# Patient Record
Sex: FEMALE | Race: WHITE | URBAN - METROPOLITAN AREA
[De-identification: names, ages, dates, MRNs, and addresses within clinical notes are randomized per-mention and may not be internally consistent; named-entity substitution may affect disease eponyms.]

---

## 2017-10-26 PROBLEM — J45.909 CHILDHOOD ASTHMA: Status: ACTIVE | Noted: 2017-10-26

## 2017-10-26 PROBLEM — Z98.891 HISTORY OF CESAREAN SECTION: Status: ACTIVE | Noted: 2017-10-26

## 2017-10-26 PROBLEM — Z34.90 PREGNANCY: Status: ACTIVE | Noted: 2017-10-26

## 2017-10-26 PROBLEM — Z86.32 HISTORY OF GESTATIONAL DIABETES: Status: ACTIVE | Noted: 2017-10-26

## 2017-12-04 PROBLEM — O24.419 GDM (GESTATIONAL DIABETES MELLITUS): Status: ACTIVE | Noted: 2017-12-04

## 2018-01-04 PROBLEM — J45.909 CHILDHOOD ASTHMA: Status: RESOLVED | Noted: 2017-10-26 | Resolved: 2018-01-04

## 2018-01-09 PROBLEM — O34.219 PREGNANCY WITH HISTORY OF CESAREAN SECTION, ANTEPARTUM: Status: ACTIVE | Noted: 2017-10-26

## 2018-01-09 PROBLEM — O09.299 CURRENT SINGLETON PREGNANCY WITH HISTORY OF CONGENITAL ANOMALY IN PRIOR CHILD, ANTEPARTUM: Status: ACTIVE | Noted: 2018-01-09

## 2018-01-09 PROBLEM — O24.410 DIET CONTROLLED GESTATIONAL DIABETES MELLITUS (GDM) IN SECOND TRIMESTER: Status: ACTIVE | Noted: 2017-12-04

## 2018-01-09 PROBLEM — O09.92 SUPERVISION OF HIGH-RISK PREGNANCY, SECOND TRIMESTER: Status: ACTIVE | Noted: 2017-10-26

## 2018-01-09 PROBLEM — O09.292 HISTORY OF GESTATIONAL DIABETES IN PRIOR PREGNANCY, CURRENTLY PREGNANT, SECOND TRIMESTER: Status: ACTIVE | Noted: 2017-10-26

## 2018-04-11 NOTE — PROGRESS NOTES
Patient ID verified. Allergies, medical history, prenatal record and prior to admission medications verified. Pt instructed to be NPO after midnight. Pt instructed to arrive at hospital @0515,come to entrance C and sign in at the registration desk on the 4th floor. Patient instructed to come to hospital sooner if SROM, labor, or concerning symptoms. Patient verbalized understanding. Questions encouraged and answered. Patient's prenatal record and scheduled delivery form have been scanned into [employer]. Results console and orders have been placed in [employer] care.

## 2018-04-12 ENCOUNTER — ANESTHESIA (OUTPATIENT)
Dept: LABOR AND DELIVERY | Age: 28
DRG: 540 | End: 2018-04-12
Payer: MEDICAID

## 2018-04-12 ENCOUNTER — ANESTHESIA EVENT (OUTPATIENT)
Dept: LABOR AND DELIVERY | Age: 28
DRG: 540 | End: 2018-04-12
Payer: MEDICAID

## 2018-04-12 ENCOUNTER — HOSPITAL ENCOUNTER (INPATIENT)
Age: 28
LOS: 2 days | Discharge: HOME | DRG: 540 | End: 2018-04-14
Attending: OBSTETRICS & GYNECOLOGY | Admitting: OBSTETRICS & GYNECOLOGY
Payer: MEDICAID

## 2018-04-12 DIAGNOSIS — Z98.891 H/O CESAREAN SECTION: Primary | ICD-10-CM

## 2018-04-12 LAB
ABO + RH BLD: NORMAL
BASE DEFICIT BLDCOA-SCNC: 1.4 MMOL/L (ref 0–2)
BASE DEFICIT BLDCOV-SCNC: 0.9 MMOL/L (ref 1.9–7.7)
BDY SITE: ABNORMAL
BDY SITE: ABNORMAL
BLOOD GROUP ANTIBODIES SERPL: NORMAL
ERYTHROCYTE [DISTWIDTH] IN BLOOD BY AUTOMATED COUNT: 14.1 % (ref 11.9–14.6)
GLUCOSE BLD STRIP.AUTO-MCNC: 72 MG/DL (ref 65–100)
GLUCOSE BLD STRIP.AUTO-MCNC: 91 MG/DL (ref 65–100)
HCO3 BLDCOA-SCNC: 26 MMOL/L (ref 22–26)
HCO3 BLDV-SCNC: 25 MMOL/L
HCT VFR BLD AUTO: 38.5 % (ref 35.8–46.3)
HGB BLD-MCNC: 12.8 G/DL (ref 11.7–15.4)
MCH RBC QN AUTO: 32.7 PG (ref 26.1–32.9)
MCHC RBC AUTO-ENTMCNC: 33.2 G/DL (ref 31.4–35)
MCV RBC AUTO: 98.5 FL (ref 79.6–97.8)
PCO2 BLDCOA: 54 MMHG (ref 33–49)
PCO2 BLDCOV: 44 MMHG (ref 14.1–43.3)
PH BLDCOA: 7.3 [PH] (ref 7.21–7.31)
PH BLDCOV: 7.37 [PH] (ref 7.2–7.44)
PLATELET # BLD AUTO: 176 K/UL (ref 150–450)
PMV BLD AUTO: 10.3 FL (ref 10.8–14.1)
PO2 BLDCOA: 28 MMHG (ref 9–19)
PO2 BLDV: 27 MMHG (ref 30.4–57.2)
RBC # BLD AUTO: 3.91 M/UL (ref 4.05–5.25)
SERVICE CMNT-IMP: ABNORMAL
SERVICE CMNT-IMP: ABNORMAL
SPECIMEN EXP DATE BLD: NORMAL
WBC # BLD AUTO: 8.6 K/UL (ref 4.3–11.1)

## 2018-04-12 PROCEDURE — 77030007880 HC KT SPN EPDRL BBMI -B: Performed by: ANESTHESIOLOGY

## 2018-04-12 PROCEDURE — 74011250636 HC RX REV CODE- 250/636: Performed by: ANESTHESIOLOGY

## 2018-04-12 PROCEDURE — 77030002974 HC SUT PLN J&J -A: Performed by: OBSTETRICS & GYNECOLOGY

## 2018-04-12 PROCEDURE — 65270000029 HC RM PRIVATE

## 2018-04-12 PROCEDURE — 82803 BLOOD GASES ANY COMBINATION: CPT

## 2018-04-12 PROCEDURE — 76010000391 HC C SECN FIRST 1 HR: Performed by: OBSTETRICS & GYNECOLOGY

## 2018-04-12 PROCEDURE — 74011000250 HC RX REV CODE- 250

## 2018-04-12 PROCEDURE — 77030018846 HC SOL IRR STRL H20 ICUM -A: Performed by: OBSTETRICS & GYNECOLOGY

## 2018-04-12 PROCEDURE — 77030003665 HC NDL SPN BBMI -A: Performed by: ANESTHESIOLOGY

## 2018-04-12 PROCEDURE — 77030032490 HC SLV COMPR SCD KNE COVD -B: Performed by: OBSTETRICS & GYNECOLOGY

## 2018-04-12 PROCEDURE — 77030005518 HC CATH URETH FOL 2W BARD -B: Performed by: OBSTETRICS & GYNECOLOGY

## 2018-04-12 PROCEDURE — 86901 BLOOD TYPING SEROLOGIC RH(D): CPT | Performed by: OBSTETRICS & GYNECOLOGY

## 2018-04-12 PROCEDURE — 85027 COMPLETE CBC AUTOMATED: CPT | Performed by: OBSTETRICS & GYNECOLOGY

## 2018-04-12 PROCEDURE — 77030002888 HC SUT CHRMC J&J -A: Performed by: OBSTETRICS & GYNECOLOGY

## 2018-04-12 PROCEDURE — 74011250636 HC RX REV CODE- 250/636

## 2018-04-12 PROCEDURE — 77030011640 HC PAD GRND REM COVD -A: Performed by: OBSTETRICS & GYNECOLOGY

## 2018-04-12 PROCEDURE — 77030018836 HC SOL IRR NACL ICUM -A: Performed by: OBSTETRICS & GYNECOLOGY

## 2018-04-12 PROCEDURE — 77030031139 HC SUT VCRL2 J&J -A: Performed by: OBSTETRICS & GYNECOLOGY

## 2018-04-12 PROCEDURE — 77030002933 HC SUT MCRYL J&J -A: Performed by: OBSTETRICS & GYNECOLOGY

## 2018-04-12 PROCEDURE — 76060000078 HC EPIDURAL ANESTHESIA: Performed by: OBSTETRICS & GYNECOLOGY

## 2018-04-12 PROCEDURE — 4A1HXCZ MONITORING OF PRODUCTS OF CONCEPTION, CARDIAC RATE, EXTERNAL APPROACH: ICD-10-PCS | Performed by: OBSTETRICS & GYNECOLOGY

## 2018-04-12 PROCEDURE — 75410000003 HC RECOV DEL/VAG/CSECN EA 0.5 HR: Performed by: OBSTETRICS & GYNECOLOGY

## 2018-04-12 PROCEDURE — 82962 GLUCOSE BLOOD TEST: CPT

## 2018-04-12 PROCEDURE — 74011250637 HC RX REV CODE- 250/637: Performed by: ANESTHESIOLOGY

## 2018-04-12 PROCEDURE — 74011250636 HC RX REV CODE- 250/636: Performed by: OBSTETRICS & GYNECOLOGY

## 2018-04-12 RX ORDER — OXYTOCIN/RINGER'S LACTATE 30/500 ML
250 PLASTIC BAG, INJECTION (ML) INTRAVENOUS ONCE
Status: DISCONTINUED | OUTPATIENT
Start: 2018-04-12 | End: 2018-04-12 | Stop reason: HOSPADM

## 2018-04-12 RX ORDER — SODIUM CHLORIDE, SODIUM LACTATE, POTASSIUM CHLORIDE, CALCIUM CHLORIDE 600; 310; 30; 20 MG/100ML; MG/100ML; MG/100ML; MG/100ML
500 INJECTION, SOLUTION INTRAVENOUS CONTINUOUS
Status: DISCONTINUED | OUTPATIENT
Start: 2018-04-12 | End: 2018-04-12 | Stop reason: HOSPADM

## 2018-04-12 RX ORDER — TRISODIUM CITRATE DIHYDRATE AND CITRIC ACID MONOHYDRATE 500; 334 MG/5ML; MG/5ML
30 SOLUTION ORAL ONCE
Status: COMPLETED | OUTPATIENT
Start: 2018-04-12 | End: 2018-04-12

## 2018-04-12 RX ORDER — HYDROMORPHONE HYDROCHLORIDE 2 MG/ML
0.5 INJECTION, SOLUTION INTRAMUSCULAR; INTRAVENOUS; SUBCUTANEOUS
Status: DISCONTINUED | OUTPATIENT
Start: 2018-04-12 | End: 2018-04-13

## 2018-04-12 RX ORDER — NALBUPHINE HYDROCHLORIDE 10 MG/ML
5 INJECTION, SOLUTION INTRAMUSCULAR; INTRAVENOUS; SUBCUTANEOUS
Status: DISCONTINUED | OUTPATIENT
Start: 2018-04-12 | End: 2018-04-13

## 2018-04-12 RX ORDER — SODIUM CHLORIDE 0.9 % (FLUSH) 0.9 %
5-10 SYRINGE (ML) INJECTION AS NEEDED
Status: DISCONTINUED | OUTPATIENT
Start: 2018-04-12 | End: 2018-04-12 | Stop reason: HOSPADM

## 2018-04-12 RX ORDER — KETOROLAC TROMETHAMINE 30 MG/ML
INJECTION, SOLUTION INTRAMUSCULAR; INTRAVENOUS AS NEEDED
Status: DISCONTINUED | OUTPATIENT
Start: 2018-04-12 | End: 2018-04-12 | Stop reason: HOSPADM

## 2018-04-12 RX ORDER — NALOXONE HYDROCHLORIDE 0.4 MG/ML
0.2 INJECTION, SOLUTION INTRAMUSCULAR; INTRAVENOUS; SUBCUTANEOUS
Status: DISCONTINUED | OUTPATIENT
Start: 2018-04-12 | End: 2018-04-13

## 2018-04-12 RX ORDER — ONDANSETRON 2 MG/ML
INJECTION INTRAMUSCULAR; INTRAVENOUS AS NEEDED
Status: DISCONTINUED | OUTPATIENT
Start: 2018-04-12 | End: 2018-04-12 | Stop reason: HOSPADM

## 2018-04-12 RX ORDER — SODIUM CHLORIDE 0.9 % (FLUSH) 0.9 %
5-10 SYRINGE (ML) INJECTION EVERY 8 HOURS
Status: DISCONTINUED | OUTPATIENT
Start: 2018-04-12 | End: 2018-04-12 | Stop reason: HOSPADM

## 2018-04-12 RX ORDER — DEXTROSE, SODIUM CHLORIDE, SODIUM LACTATE, POTASSIUM CHLORIDE, AND CALCIUM CHLORIDE 5; .6; .31; .03; .02 G/100ML; G/100ML; G/100ML; G/100ML; G/100ML
125 INJECTION, SOLUTION INTRAVENOUS CONTINUOUS
Status: DISCONTINUED | OUTPATIENT
Start: 2018-04-12 | End: 2018-04-12 | Stop reason: HOSPADM

## 2018-04-12 RX ORDER — CEFAZOLIN SODIUM/WATER 2 G/20 ML
2 SYRINGE (ML) INTRAVENOUS ONCE
Status: COMPLETED | OUTPATIENT
Start: 2018-04-12 | End: 2018-04-12

## 2018-04-12 RX ORDER — EPHEDRINE SULFATE 50 MG/ML
INJECTION, SOLUTION INTRAVENOUS AS NEEDED
Status: DISCONTINUED | OUTPATIENT
Start: 2018-04-12 | End: 2018-04-12 | Stop reason: HOSPADM

## 2018-04-12 RX ORDER — OXYTOCIN/RINGER'S LACTATE 30/500 ML
PLASTIC BAG, INJECTION (ML) INTRAVENOUS
Status: DISCONTINUED | OUTPATIENT
Start: 2018-04-12 | End: 2018-04-12 | Stop reason: HOSPADM

## 2018-04-12 RX ORDER — BUPIVACAINE HYDROCHLORIDE 7.5 MG/ML
INJECTION, SOLUTION INTRASPINAL AS NEEDED
Status: DISCONTINUED | OUTPATIENT
Start: 2018-04-12 | End: 2018-04-12 | Stop reason: HOSPADM

## 2018-04-12 RX ORDER — ACETAMINOPHEN 325 MG/1
650 TABLET ORAL
Status: DISCONTINUED | OUTPATIENT
Start: 2018-04-12 | End: 2018-04-13

## 2018-04-12 RX ORDER — MORPHINE SULFATE 0.5 MG/ML
INJECTION, SOLUTION EPIDURAL; INTRATHECAL; INTRAVENOUS AS NEEDED
Status: DISCONTINUED | OUTPATIENT
Start: 2018-04-12 | End: 2018-04-12 | Stop reason: HOSPADM

## 2018-04-12 RX ORDER — SIMETHICONE 80 MG
80 TABLET,CHEWABLE ORAL
Status: DISCONTINUED | OUTPATIENT
Start: 2018-04-13 | End: 2018-04-14 | Stop reason: HOSPADM

## 2018-04-12 RX ORDER — FAMOTIDINE 20 MG/1
20 TABLET, FILM COATED ORAL ONCE
Status: COMPLETED | OUTPATIENT
Start: 2018-04-12 | End: 2018-04-12

## 2018-04-12 RX ORDER — OXYCODONE HYDROCHLORIDE 5 MG/1
10 TABLET ORAL
Status: DISCONTINUED | OUTPATIENT
Start: 2018-04-12 | End: 2018-04-13

## 2018-04-12 RX ORDER — SODIUM CHLORIDE, SODIUM LACTATE, POTASSIUM CHLORIDE, CALCIUM CHLORIDE 600; 310; 30; 20 MG/100ML; MG/100ML; MG/100ML; MG/100ML
125 INJECTION, SOLUTION INTRAVENOUS CONTINUOUS
Status: DISCONTINUED | OUTPATIENT
Start: 2018-04-12 | End: 2018-04-13

## 2018-04-12 RX ORDER — KETOROLAC TROMETHAMINE 30 MG/ML
15 INJECTION, SOLUTION INTRAMUSCULAR; INTRAVENOUS
Status: DISCONTINUED | OUTPATIENT
Start: 2018-04-12 | End: 2018-04-13

## 2018-04-12 RX ADMIN — Medication 500 ML/HR: at 07:50

## 2018-04-12 RX ADMIN — SODIUM CITRATE AND CITRIC ACID MONOHYDRATE 30 ML: 500; 334 SOLUTION ORAL at 07:17

## 2018-04-12 RX ADMIN — SODIUM CHLORIDE, SODIUM LACTATE, POTASSIUM CHLORIDE, AND CALCIUM CHLORIDE 125 ML/HR: 600; 310; 30; 20 INJECTION, SOLUTION INTRAVENOUS at 09:50

## 2018-04-12 RX ADMIN — BUPIVACAINE HYDROCHLORIDE 1.5 ML: 7.5 INJECTION, SOLUTION INTRASPINAL at 07:34

## 2018-04-12 RX ADMIN — EPHEDRINE SULFATE 5 MG: 50 INJECTION, SOLUTION INTRAVENOUS at 08:10

## 2018-04-12 RX ADMIN — SODIUM CHLORIDE, SODIUM LACTATE, POTASSIUM CHLORIDE, AND CALCIUM CHLORIDE: 600; 310; 30; 20 INJECTION, SOLUTION INTRAVENOUS at 07:28

## 2018-04-12 RX ADMIN — EPHEDRINE SULFATE 5 MG: 50 INJECTION, SOLUTION INTRAVENOUS at 07:48

## 2018-04-12 RX ADMIN — KETOROLAC TROMETHAMINE 30 MG: 30 INJECTION, SOLUTION INTRAMUSCULAR; INTRAVENOUS at 08:14

## 2018-04-12 RX ADMIN — SODIUM CHLORIDE, SODIUM LACTATE, POTASSIUM CHLORIDE, AND CALCIUM CHLORIDE 125 ML/HR: 600; 310; 30; 20 INJECTION, SOLUTION INTRAVENOUS at 17:37

## 2018-04-12 RX ADMIN — FAMOTIDINE 20 MG: 20 TABLET, FILM COATED ORAL at 07:17

## 2018-04-12 RX ADMIN — MORPHINE SULFATE 250 MCG: 0.5 INJECTION, SOLUTION EPIDURAL; INTRATHECAL; INTRAVENOUS at 07:34

## 2018-04-12 RX ADMIN — EPHEDRINE SULFATE 10 MG: 50 INJECTION, SOLUTION INTRAVENOUS at 07:39

## 2018-04-12 RX ADMIN — Medication 2 G: at 07:17

## 2018-04-12 RX ADMIN — EPHEDRINE SULFATE 5 MG: 50 INJECTION, SOLUTION INTRAVENOUS at 07:47

## 2018-04-12 RX ADMIN — EPHEDRINE SULFATE 5 MG: 50 INJECTION, SOLUTION INTRAVENOUS at 08:01

## 2018-04-12 RX ADMIN — MORPHINE SULFATE 4.75 MG: 0.5 INJECTION, SOLUTION EPIDURAL; INTRATHECAL; INTRAVENOUS at 08:09

## 2018-04-12 RX ADMIN — EPHEDRINE SULFATE 10 MG: 50 INJECTION, SOLUTION INTRAVENOUS at 07:57

## 2018-04-12 RX ADMIN — KETOROLAC TROMETHAMINE 15 MG: 30 INJECTION, SOLUTION INTRAMUSCULAR at 20:06

## 2018-04-12 RX ADMIN — ONDANSETRON 4 MG: 2 INJECTION INTRAMUSCULAR; INTRAVENOUS at 07:51

## 2018-04-12 NOTE — PROGRESS NOTES
SBAR IN Report: Mother    Verbal report received from [facility]  on this patient, who is now being transferred from &D  for routine progression of care. The patient is wearing a green \"Anesthesia-Duramorph\" band. Report consisted of patient's Situation, Background, Assessment and Recommendations (SBAR). [place] ID bands were compared with the identification form, and verified with the patient and transferring nurse. Information from the SBAR and the [place] Report was reviewed with the transferring nurse; opportunity for questions and clarification provided.

## 2018-04-12 NOTE — ADDENDUM NOTE
Addendum  created 04/12/18 1348 by Rebeka Henry, CRNA    Anesthesia Intra Meds edited, Orders acknowledged in Narrator

## 2018-04-12 NOTE — PROGRESS NOTES
SBAR OUT Report: Mother    Verbal report given to Serenity Melara RN (full name & credentials) on this patient, who is now being transferred to MIU (unit) for routine progression of care. The patient is wearing a green \"Anesthesia-Duramorph\" band. Report consisted of patient's Situation, Background, Assessment and Recommendations (SBAR). [place] ID bands were compared with the identification form, and verified with the patient and receiving nurse. Information from the SBAR, OR Summary and Intake/Output and the [place] Report was reviewed with the receiving nurse; opportunity for questions and clarification provided.

## 2018-04-12 NOTE — LACTATION NOTE

## 2018-04-12 NOTE — LACTATION NOTE
In to see mom and baby for lactation visit. Third time mom reports first child was in SCN for 2 months so she pumped and then got him to latch with a nipple shield. After one month, baby weaned from nipple shield and  for over one year. Second child also nursed well for over one year. Mom reports this baby has latched and nursed well once since birth. Has not been very alert and awake since. Assisted mom in unwrapping baby and placing skin to skin for feeding. Mom has medium, slightly pendulous breasts. Confident with positioning and tries baby in cradle and football. Baby sleepy despite waking techniques and only sucks a few times. Mom also still vomiting from surgery. Discussed first 24 hour expectations. Baby may be sleepy but wake up more tonight. Once 24 hours old, should be more alert and eating at least 8 times in 24 hours. Introduced packet and encouraged mom to call out with any needs. Lactation to follow tomorrow.

## 2018-04-12 NOTE — L&D DELIVERY NOTE
Delivery Summary    Patient: Vivian Rosario MRN: [id number]  SSN: [id number]    YOB: 1990  Age: 32 y.o. Sex: female           Section Delivery Operative Report    Date of Surgery: 2018      Preoperative Diagnosis: page 2018 Repeat  Section    Postoperative Diagnosis: same with viable female weight 7#2oz. apgars Tauro@[employer] and 9@5    Procedure: Procedure(s):   SECTION    Surgeon(s) and Role:     * Guilherme Rooney MD - Assisting     * René Soliz DO - Primary     Anesthesia: Spinal    Findings: Grossly normal uterus, tubes, and ovaries. Intravenous Fluids: 1000cc    Urine Output: 200cc    Estimated Blood Loss:    940VK    Complications: none     Specimens:   ID Type Source Tests Collected by Time Destination   1 : placenta Placenta Uterus  René Soliz DO 2018 0800 Discarded         Procedure Detail:      The patient was taken to the operating room, where anesthesia was found to be adequate. The patient was prepped and draped in the normal sterile fashion. Pfannenstiel skin incision was made with the scalpel and carried down to the underlying fascia. The fascial incision was extended laterally with Whiting scissors. This fascial incision was grasped with Kocher clamps, tented up, and the underlying rectus muscles were dissected sharply. The inferior edge of the rectus fascia was grasped with Kocher clamps, tented up, and the underlying rectus muscle was dissected off sharply. The rectus muscle was divided in the midline bluntly. The peritoneum was entered bluntly and extended inferiorly and superiorly with Metzenbaum scissors. The bladder blade was then inserted. The vesicouterine peritoneum was identified, grasped with pick-ups and entered sharply with Metzenbaum scissors. The bladder flap was then created digitally and the bladder blade was reinserted.      A low transverse uterine incision was made with the scalpel and extended superiorly inferiorly with blunt finger dissection. The babys head was then delivered atraumatically. The nose and mouth were suctioned. Shoulders and body were delivered with gentle fundal pressure. The cord was clamped and cut and the baby was handed off to the waiting  care unit staff. Placenta was then expressed. The uterus was exteriorized and cleared of all clots and debris. The uterine incision was closed in single layer with good hemostasis. The posterior cul-de-sac was washed with warm normal saline. Good hemostasis was again reassured. The uterus was returned to the abdominal cavity. The paracolic gutters were washed with warm normal saline. Good hemostasis was again reassured throughout. The peritoneum was closed with 2-0 Vicryl in a running fashion and the rectus muscles reapproximated in the midline using 0-chromic. The fascia was closed with  0 PDS in a running fashion. Good hemostasis was assured. The subcuticular layers were reapproximated with 2-0 plain gut in interrupted fashion. The skin was closed with a 4-0 monocryl in a subcuticular fashion. The patient tolerated the procedure well. Sponge, lap, and needle counts were correct times two and the patient was taken to recovery in stable condition.     Signed By: Cassi Kebede DO     2018               Labor Events:    Labor: No    Rupture Date:      Rupture Time:      Rupture Type: AROM    Amniotic Fluid Volume:      Amniotic Fluid Description:  Amniotic fluid Odor: Clear    None     Induction: None        Induction Date:        Induction Time:       Indications for Induction:       Augmentation: None    Augmentation Date:      Augmentation Time:      Indications for Augmentation:      Events:       Cervical Ripening:       None    Rupture Identifier:       Labor complications: None     Additional complications:         Delivery Events:  Estimated Blood Loss (ml):        Information for the patient's :  Denisha Paulson Candis Stephen [[id number]]     Delivery Summary - Baby    Delivery Date: 2018  Delivery Time: 7:49 AM  Delivery Type: , Low Transverse    Section Delivery:     Sex:  female     Gestational Age: 39w0d  Delivery Clinician:  FRANCISCA CHUNG   Living?: Living  Delivery Location: OR           APGARS  One minute Five minutes Ten minutes   Skin color: 1   1        Heart rate: 2   2        Grimace: 2   2        Muscle tone: 2   2        Breathin   2        Totals: 9   9           Presentation: Vertex    Position: Left Occiput Anterior  Resuscitation Method:  Suctioning-bulb; Tactile Stimulation     Meconium Stained: None      Cord Information: 3 Vessels   Complications: None  Cord Blood Sent?:  Yes    Blood Gases Sent?:  Yes    Placenta:  Date/Time:   7:50 AM  Removal: Expressed      Appearance: Normal     [place] Measurements:  Birth Weight: 7 lb 1.9 oz (3.23 kg)      Birth Length: 1' 8.47\" (0.52 m)      Head Circumference: 1' 1.78\" (0.35 m)      Chest Circumference: 1' 0.6\" (0.32 m)     Abdominal Girth:       Other Providers:   Stu CHUNG KATHERINE P;EVAN ALLAN;ANGELICA OHARA;AKIL ALEGRIA;CESARIO RAMIREZ;LUANNE POTTS [place] LYNN Obstetrician;Primary Nurse;Primary  Nurse;Scrub Tech;Neonatologist;Anesthesiologist;Crna;Respiratory Therapist           Group Beta Strep:   Lab Results   Component Value Date/Time    GrBStrep, External neg 2018        Cord Blood Results:  Information for the patient's :  [facility] [[id number]]   No results found for: ABORH, PCTABR, PCTDIG, BILI, ABORHEXT, ABORH    Information for the patient's :  [facility] [[id number]]   No results found for: APH, APCO2, APO2, AHCO3, ABEC, ABDC, O2ST, SITE, RSCOM, PHI, PCO2I, PO2I, HCO3I, SO2I, IBD    Information for the patient's :  [facility] [[id number]]   No results found for: EPHV, PCO2V, PO2V, HCO3V, O2STV, EBDV

## 2018-04-12 NOTE — PROGRESS NOTES
Pt states nausea is much better now. States she is just resting, sipping on ginger ale and eating crackers few bites at a time. No s/s distress noted at this time.

## 2018-04-12 NOTE — ANESTHESIA PREPROCEDURE EVALUATION
Anesthetic History   No history of anesthetic complications            Review of Systems / Medical History  Patient summary reviewed and pertinent labs reviewed    Pulmonary              Pertinent negatives: Asthma: Childhood asthma only, currently well controlled.      Neuro/Psych   Within defined limits           Cardiovascular                  Exercise tolerance: >4 METS  Comments: Denies CV history   GI/Hepatic/Renal     GERD: well controlled           Endo/Other    Diabetes (Gestational DM)         Other Findings              Physical Exam    Airway  Mallampati: II  TM Distance: > 6 cm  Neck ROM: normal range of motion   Mouth opening: Normal     Cardiovascular    Rhythm: regular  Rate: normal         Dental  No notable dental hx       Pulmonary  Breath sounds clear to auscultation               Abdominal  GI exam deferred       Other Findings            Anesthetic Plan    ASA: 2  Anesthesia type: spinal            Anesthetic plan and risks discussed with: Patient and Spouse

## 2018-04-12 NOTE — ANESTHESIA POSTPROCEDURE EVALUATION
Post-Anesthesia Evaluation and Assessment    Patient: Malik Foster MRN: [id number]  SSN: [id number]    YOB: 1990  Age: 32 y.o. Sex: female       Cardiovascular Function/Vital Signs  Visit Vitals    /56 (BP 1 Location: Left arm, BP Patient Position: At rest)    Pulse 93    Temp 36.4 °C (97.6 °F)    Resp 17    SpO2 98%    Breastfeeding Unknown       Patient is status post spinal anesthesia for Procedure(s):   SECTION. Nausea/Vomiting: None    Postoperative hydration reviewed and adequate. Pain:  Pain Scale 1: Numeric (0 - 10) (18)  Pain Intensity 1: 0 (18)   Managed    Neurological Status:   Neuro (WDL): Within Defined Limits (18 0546)   At baseline    Mental Status and Level of Consciousness: Arousable    Pulmonary Status:   O2 Device: Room air (18)   Adequate oxygenation and airway patent    Complications related to anesthesia: None    Post-anesthesia assessment completed.  No concerns    Signed By: Nati Storey MD     2018

## 2018-04-12 NOTE — H&P
History & Physical    Name: Barbara Kate MRN: [id number]  SSN: [id number]    YOB: 1990  Age: 32 y.o. Sex: female      Subjective:     Estimated Date of Delivery: 18  OB History    Para Term  AB Living   3 2 2 0 0 2   SAB TAB Ectopic Molar Multiple Live Births   0 0 0 0 0 2      # Outcome Date GA Lbr Dominic/2nd Weight Sex Delivery Anes PTL Lv   3 Current            2 Term 11/12/15 39w0d  7 lb 12.5 oz (3.53 kg) M  LO SPINAL AN N JOSE ANGEL   1 Term 13 37w0d  5 lb 10 oz (2.551 kg) M CS-LTranv   JOSE ANGEL      Complications: Pregnancy complicated by gastroschisis          Ms. Isabelle Mar admitted with pregnancy at 39w0d for  section due to previous  section. Prenatal course was complicated by diabetes - gestational. Please see prenatal records for details. Past Medical History:   Diagnosis Date    Asthma     in childhood, no current meds    Childhood asthma 10/26/2017    No current issues or meds.  GDM (gestational diabetes mellitus) ,    Gestational diabetes     H/O: pneumonia     Pregnancy complicated by gastroschisis 2013    Psychiatric problem     anxiety and depression     Past Surgical History:   Procedure Laterality Date     DELIVERY ONLY  2013    HX WISDOM TEETH EXTRACTION       Social History     Occupational History    Not on file. Social History Main Topics    Smoking status: Never Smoker    Smokeless tobacco: Never Used    Alcohol use No    Drug use: No    Sexual activity: Yes     Partners: Male     Family History   Problem Relation Age of Onset    No Known Problems Mother     Diabetes Father     Heart Disease Father     Alzheimer Maternal Grandmother     Diabetes Maternal Grandfather        No Known Allergies  Prior to Admission medications    Medication Sig Start Date End Date Taking? Authorizing Provider   ranitidine (ZANTAC) 150 mg tablet Take 150 mg by mouth two (2) times a day.  Indications: HEARTBURN   Yes Historical Provider   PNV no.24-iron-folic acid-dha (PRENATAL DHA+COMPLETE PRENATAL) -300 mg-mcg-mg cmpk Take  by mouth. Yes Historical Provider        Review of Systems: A comprehensive review of systems was negative except for that written in the History of Present Illness. Objective:     Vitals:  Vitals:    18 0546   BP: 123/72   Pulse: (!) 101   Resp: 22   Temp: 98.1 °F (36.7 °C)        Physical Exam:  Patient without distress. Heart: Regular rate and rhythm or S1S2 present  Lung: clear to auscultation throughout lung fields, no wheezes, no rales, no rhonchi and normal respiratory effort  Abdomen: soft, nontender  Lower Extremities:  - Edema No  Membranes:  Intact  Fetal Heart Rate: Reactive    Prenatal Labs:   Lab Results   Component Value Date/Time    ABO/Rh(D) A POSITIVE 2018 05:21 AM    Rubella, External immune 10/26/2017    GrBStrep, External neg 2018    HBsAg, External negative 10/26/2017    HIV, External NR 10/26/2017    RPR, External NR 10/26/2017    Gonorrhea, External negative 10/30/2017    Chlamydia, External negative 10/30/2017    ABO,Rh A positive 10/26/2017         Impression/Plan:     Plan:  Admit for  section. Group B Strep was negative. Discussed the risks of surgery including the risks of bleeding, infection, deep vein thrombosis, and surgical injuries to internal organs including but not limited to the bowels, bladder, rectum, and female reproductive organs. The patient understands the risks; any and all questions were answered to the patient's satisfaction.     Signed By:  Collette Dacosta, DO     2018

## 2018-04-12 NOTE — IP AVS SNAPSHOT
[address] [facility] 
 
 
 [id number] 
[phone] Patient: Mary Rajput MRN: [id number] PMQ: About your hospitalization You were admitted on:  2018 You last received care in the:  [address] [facility] You were discharged on:  2018 Why you were hospitalized Your primary diagnosis was:  H/O  Section Follow-up Information Follow up With Details Comments Contact Info Jenna Soliz DO Schedule an appointment as soon as possible for a visit in 2 weeks  [address] [facility] [address] [place] [address] 
[phone] None   None (395) Patient stated that they have no PCP Your Scheduled Appointments 2018 10:45 AM EDT PostPartum 2 week with Jenna Soliz DO [facility] ([place] [address]) [address] [place] [id number] [phone] Discharge Orders Procedure Order Date Status Priority Quantity Spec Type Associated Dx CALL YOUR DOCTOR For: Difficulty breathing, headache, or visual disturbances. , Extreme fatigue. , Persistant dizziness or light-headedness. , Persistant nausea and vomiting., Redness, tenderness, or signs of infection. , Severe uncontrolled pain., Te. .. 18 Normal Routine 1  H/O  section [[id number]] Questions: For:  Difficulty breathing, headache, or visual disturbances. For:  Extreme fatigue. For:  Persistant dizziness or light-headedness. For:  Persistant nausea and vomiting. For:  Redness, tenderness, or signs of infection. For:  Severe uncontrolled pain. For:  Temperature greater than 100.4. ACTIVITY AFTER DISCHARGE Patient should: Restrict driving, Restrict lifting, Restrict sexual activity. Pelvic Rest 18 Normal Routine 1  H/O  section [[id number]]   Comments:  Pelvic Rest  
 Questions: Patient should:  Restrict driving Patient should:  Restrict lifting Patient should:  Restrict sexual activity. DIET REGULAR No added salt 18 0957 Normal Routine 1  H/O  section [[id number]] Questions: Additional options:  No added salt A check marco indicates which time of day the medication should be taken. My Medications START taking these medications Instructions Each Dose to Equal  
 Morning Noon Evening Bedtime  
 ibuprofen 800 mg tablet Commonly known as:  MOTRIN Your last dose was: Your next dose is: Take 1 Tab by mouth every six (6) hours as needed. 800 mg  
    
   
   
   
  
 oxyCODONE-acetaminophen 7.5-325 mg per tablet Commonly known as:  PERCOCET 7.5 Your last dose was: Your next dose is: Take 1 Tab by mouth every four (4) hours as needed. Max Daily Amount: 6 Tabs. 1 Tab CONTINUE taking these medications Instructions Each Dose to Equal  
 Morning Noon Evening Bedtime PRENATAL DHA+COMPLETE PRENATAL -300 mg-mcg-mg Cmpk Generic drug:  [id number] abdirahman-folic-dha Your last dose was: Your next dose is: Take  by mouth. ZANTAC 150 mg tablet Generic drug:  raNITIdine Your last dose was: Your next dose is: Take 150 mg by mouth two (2) times a day. Indications: HEARTBURN  
 150 mg Where to Get Your Medications Information on where to get these meds will be given to you by the nurse or doctor. ! Ask your nurse or doctor about these medications  
  ibuprofen 800 mg tablet  
 oxyCODONE-acetaminophen 7.5-325 mg per tablet Opioid Education  Prescription Opioids: What You Need to Know: 
 
Prescription opioids can be used to help relieve moderate-to-severe pain and are often prescribed following a surgery or injury, or for certain health conditions. These medications can be an important part of treatment but also come with serious risks. Opioids are strong pain medicines. Examples include hydrocodone, oxycodone, fentanyl, and morphine. Heroin is an example of an illegal opioid. It is important to work with your health care provider to make sure you are getting the safest, most effective care. WHAT ARE THE RISKS AND SIDE EFFECTS OF OPIOID USE? Prescription opioids carry serious risks of addiction and overdose, especially with prolonged use. An opioid overdose, often marked by slow breathing, can cause sudden death. The use of prescription opioids can have a number of side effects as well, even when taken as directed. · Tolerance-meaning you might need to take more of a medication for the same pain relief · Physical dependence-meaning you have symptoms of withdrawal when the medication is stopped. Withdrawal symptoms can include nausea, sweating, chills, diarrhea, stomach cramps, and muscle aches. Withdrawal can last up to several weeks, depending on which drug you took and how long you took it. · Increased sensitivity to pain · Constipation · Nausea, vomiting, and dry mouth · Sleepiness and dizziness · Confusion · Depression · Low levels of testosterone that can result in lower sex drive, energy, and strength · Itching and sweating RISKS ARE GREATER WITH:      
· History of drug misuse, substance use disorder, or overdose · Mental health conditions (such as depression or anxiety) · Sleep apnea · Older age (72 years or older) · Pregnancy Avoid alcohol while taking prescription opioids. Also, unless specifically advised by your health care provider, medications to avoid include: · Benzodiazepines (such as Xanax or Valium) · Muscle relaxants (such as Soma or Flexeril) · Hypnotics (such as Ambien or Lunesta) · Other prescription opioids KNOW YOUR OPTIONS Talk to your health care provider about ways to manage your pain that don't involve prescription opioids. Some of these options may actually work better and have fewer risks and side effects. Options may include: 
· Pain relievers such as acetaminophen, ibuprofen, and naproxen · Some medications that are also used for depression or seizures · Physical therapy and exercise · Counseling to help patients learn how to cope better with triggers of pain and stress. · Application of heat or cold compress · Massage therapy · Relaxation techniques Be Informed Make sure you know the name of your medication, how much and how often to take it, and its potential risks & side effects. IF YOU ARE PRESCRIBED OPIOIDS FOR PAIN: 
· Never take opioids in greater amounts or more often than prescribed. Remember the goal is not to be pain-free but to manage your pain at a tolerable level. · Follow up with your primary care provider to: · Work together to create a plan on how to manage your pain. · Talk about ways to help manage your pain that don't involve prescription opioids. · Talk about any and all concerns and side effects. · Help prevent misuse and abuse. · Never sell or share prescription opioids · Help prevent misuse and abuse. · Store prescription opioids in a secure place and out of reach of others (this may include visitors, children, friends, and family). · Safely dispose of unused/unwanted prescription opioids: Find your community drug take-back program or your pharmacy mail-back program, or flush them down the toilet, following guidance from the Food and Drug Administration (www.fda.gov/Drugs/ResourcesForYou). · Visit www.cdc.gov/drugoverdose to learn about the risks of opioid abuse and overdose. · If you believe you may be struggling with addiction, tell your health care provider and ask for guidance or call [employer] at 4-477-762-WNRD. Discharge Instructions  Section: What to Expect at [facility] Your Recovery A  section, or , is surgery to deliver your baby through a cut, called an incision, that the doctor makes in your lower belly and uterus. You may have some pain in your lower belly and need pain medicine for 1 to 2 weeks. You can expect some vaginal bleeding for several weeks. You will probably need about 6 weeks to fully recover. It is important to take it easy while the incision is healing. Avoid heavy lifting, strenuous activities, or exercises that strain the belly muscles while you are recovering. Ask a family member or friend for help with housework, cooking, and shopping. This care sheet gives you a general idea about how long it will take for you to recover. But each person recovers at a different pace. Follow the steps below to get better as quickly as possible. How can you care for yourself at home? Activity ? · Rest when you feel tired. Getting enough sleep will help you recover. ? · Try to walk each day. Start by walking a little more than you did the day before. Bit by bit, increase the amount you walk. Walking boosts blood flow and helps prevent pneumonia, constipation, and blood clots. ? · Avoid strenuous activities, such as bicycle riding, jogging, weightlifting, and aerobic exercise, for 6 weeks or until your doctor says it is okay. ? · Until your doctor says it is okay, do not lift anything heavier than your baby. ? · Do not do sit-ups or other exercises that strain the belly muscles for 6 weeks or until your doctor says it is okay. ? · Hold a pillow over your incision when you cough or take deep breaths. This will support your belly and decrease your pain. ? · You may shower as usual. Pat the incision dry when you are done. ? · You will have some vaginal bleeding. Wear sanitary pads.  Do not douche or use tampons until your doctor says it is okay. ? · Ask your doctor when you can drive again. ? · You will probably need to take at least 6 weeks off work. It depends on the type of work you do and how you feel. ? · Ask your doctor when it is okay for you to have sex. Diet ? · You can eat your normal diet. If your stomach is upset, try bland, low-fat foods like plain rice, broiled chicken, toast, and yogurt. ? · Drink plenty of fluids (unless your doctor tells you not to). ? · You may notice that your bowel movements are not regular right after your surgery. This is common. Try to avoid constipation and straining with bowel movements. You may want to take a fiber supplement every day. If you have not had a bowel movement after a couple of days, ask your doctor about taking a mild laxative. ? · If you are breastfeeding, do not drink any alcohol. Medicines ? · Your doctor will tell you if and when you can restart your medicines. He or she will also give you instructions about taking any new medicines. ? · If you take blood thinners, such as warfarin (Coumadin), clopidogrel (Plavix), or aspirin, be sure to talk to your doctor. He or she will tell you if and when to start taking those medicines again. Make sure that you understand exactly what your doctor wants you to do. ? · Take pain medicines exactly as directed. ¨ If the doctor gave you a prescription medicine for pain, take it as prescribed. ¨ If you are not taking a prescription pain medicine, ask your doctor if you can take an over-the-counter medicine. ? · If you think your pain medicine is making you sick to your stomach: 
¨ Take your medicine after meals (unless your doctor has told you not to). ¨ Ask your doctor for a different pain medicine. ? · If your doctor prescribed antibiotics, take them as directed. Do not stop taking them just because you feel better. You need to take the full course of antibiotics. Incision care ? · If you have strips of tape on the incision, leave the tape on for a week or until it falls off.  
? · Wash the area daily with warm, soapy water, and pat it dry. Don't use hydrogen peroxide or alcohol, which can slow healing. You may cover the area with a gauze bandage if it weeps or rubs against clothing. Change the bandage every day. ? · Keep the area clean and dry. Other instructions ? · If you breastfeed your baby, you may be more comfortable while you are healing if you place the baby so that he or she is not resting on your belly. Try tucking your baby under your arm, with his or her body along the side you will be feeding on. Support your baby's upper body with your arm. With that hand you can control your baby's head to bring his or her mouth to your breast. This is sometimes called the Medikidz hold. Follow-up care is a key part of your treatment and safety. Be sure to make and go to all appointments, and call your doctor if you are having problems. It's also a good idea to know your test results and keep a list of the medicines you take. When should you call for help? Call 911 anytime you think you may need emergency care. For example, call if: 
? · You passed out (lost consciousness). ? · You have chest pain, are short of breath, or cough up blood. ?Call your doctor now or seek immediate medical care if: 
? · You have pain that does not get better after you take pain medicine. ? · You have severe vaginal bleeding. ? · You are dizzy or lightheaded, or you feel like you may faint. ? · You have new or worse pain in your belly or pelvis. ? · You have loose stitches, or your incision comes open. ? · You have symptoms of infection, such as: 
¨ Increased pain, swelling, warmth, or redness. ¨ Red streaks leading from the incision. ¨ Pus draining from the incision. ¨ A fever. ? · You have symptoms of a blood clot in your leg (called a deep vein thrombosis), such as: ¨ Pain in your calf, back of the knee, thigh, or groin. ¨ Redness and swelling in your leg or groin. ? Watch closely for changes in your health, and be sure to contact your doctor if: 
? · You do not get better as expected. Where can you learn more? Go to http://deniz-gideon.info/. Enter M806 in the search box to learn more about \" Section: What to Expect at Home. \" Current as of: 2017 Content Version: 11.4 © 0643-0482 HeartThis. Care instructions adapted under license by [employer] (which disclaims liability or warranty for this information). If you have questions about a medical condition or this instruction, always ask your healthcare professional. [address] any warranty or liability for your use of this information. [employer] Announcement We are excited to announce that we are making your provider's discharge notes available to you in [employer]ap[employer] Online. You will see these notes when they are completed and signed by the physician that discharged you from your recent hospital stay. If you have any questions or concerns about any information you see in [employer] Online, please call the Health Information Department where you were seen or reach out to your Primary Care Provider for more information about your plan of care. Introducing [facility]! Dear Marissa Estrella: Thank you for requesting a [employer] Online account. Our records indicate that you already have an active [employer] Online account. You can access your account anytime at [email or web address] Did you know that you can access your hospital and ER discharge instructions at any time in [employer]rog Online? You can also review all of your test results from your hospital stay or ER visit. Additional Information If you have questions, please visit the Frequently Asked Questions section of the [employer] website at [email or web address] Remember, MyChart is NOT to be used for urgent needs. For medical emergencies, dial 911. Now available from your iPhone and Android! Introducing Jordon Mirza As a [employer] patient, I wanted to make you aware of our electronic visit tool called Jordon Mirza. [employer] 24/7 allows you to connect within minutes with a medical provider 24 hours a day, seven days a week via a mobile device or tablet or logging into a secure website from your computer. You can access Jordon Mirza from anywhere in the [place]. A virtual visit might be right for you when you have a simple condition and feel like you just dont want to get out of bed, or cant get away from work for an appointment, when your regular [employer] provider is not available (evenings, weekends or holidays), or when youre out of town and need minor care. Electronic visits cost only $49 and if the [employer] 24/7 provider determines a prescription is needed to treat your condition, one can be electronically transmitted to a nearby pharmacy*. Please take a moment to enroll today if you have not already done so. The enrollment process is free and takes just a few minutes. To enroll, please download the [employer] 24/7 meliton to your tablet or phone, or visit [email or web address] to enroll on your computer. And, as an [address] [place] [address] patient with a [employer] account, the results of your visits will be scanned into your electronic medical record and your primary care provider will be able to view the scanned results. We urge you to continue to see your regular [employer] provider for your ongoing medical care. And while your primary care provider may not be the one available when you seek a Jordon Mirza virtual visit, the peace of mind you get from getting a real diagnosis real time can be priceless.    
 
For more information on Jordon Justinflorentinofin, view our Frequently Asked Questions (FAQs) at [email or web address]. Sincerely, 
 
Rick Mehta MD 
Chief Medical Officer [address] *:  certain medications cannot be prescribed via Jordon Mirza Providers Seen During Your Hospitalization Provider Specialty Primary office phone Vela Lennox, DO Obstetrics & Gynecology [phone] Immunizations Administered for This Admission Name Date Tdap  Deferred () Your Primary Care Physician (PCP) Primary Care Physician Office Phone Office Fax NONE ** None ** ** None ** You are allergic to the following No active allergies Recent Documentation Breastfeeding? OB Status Smoking Status Unknown Recent pregnancy Never Smoker Emergency Contacts Name Discharge Info Relation Home Work Mobile Lakhwinder Lazcano  Spouse [3] [phone] Patient Belongings The following personal items are in your possession at time of discharge: 
  Dental Appliances: None  Visual Aid: None      Home Medications: None   Jewelry: None  Clothing: At bedside, [place], Dress, [place], [place], [place], [place], [employer], Socks, With patient    Other Valuables: [employer], Keys, Prosthesis, Wallet  Personal Items Sent to Safe: none Please provide this summary of care documentation to your next provider. Signatures-by signing, you are acknowledging that this After Visit Summary has been reviewed with you and you have received a copy. Patient Signature:  ____________________________________________________________ Date:  ____________________________________________________________  
  
Alisson Perez Provider Signature:  ____________________________________________________________ Date:  ____________________________________________________________

## 2018-04-12 NOTE — PROGRESS NOTES
Problem: Nutrition Deficit  Goal: *Optimize nutritional status  Nutrition  Received referral from Malnutrition Screening Tool r/t patient has Gestational Diabetes. Pt s/p  Section this am.  Therefore this no longer meets criteria for referral.  F/U per nutrition standard of care.     Mela Callahan, [address] [place] [address], [facility]  [phone]

## 2018-04-12 NOTE — IP AVS SNAPSHOT
[address] 
[phone] Patient: Ashley Foy MRN: [id number] MWE:[birth year] A check marco indicates which time of day the medication should be taken. My Medications START taking these medications Instructions Each Dose to Equal  
 Morning Noon Evening Bedtime  
 ibuprofen 800 mg tablet Commonly known as:  MOTRIN Your last dose was: Your next dose is: Take 1 Tab by mouth every six (6) hours as needed. 800 mg  
    
   
   
   
  
 oxyCODONE-acetaminophen 7.5-325 mg per tablet Commonly known as:  PERCOCET 7.5 Your last dose was: Your next dose is: Take 1 Tab by mouth every four (4) hours as needed. Max Daily Amount: 6 Tabs. 1 Tab CONTINUE taking these medications Instructions Each Dose to Equal  
 Morning Noon Evening Bedtime PRENATAL DHA+COMPLETE PRENATAL -300 mg-mcg-mg Cmpk Generic drug:  [id number] abdirahman-folic-dha Your last dose was: Your next dose is: Take  by mouth. ZANTAC 150 mg tablet Generic drug:  raNITIdine Your last dose was: Your next dose is: Take 150 mg by mouth two (2) times a day. Indications: HEARTBURN  
 150 mg Where to Get Your Medications Information on where to get these meds will be given to you by the nurse or doctor. ! Ask your nurse or doctor about these medications  
  ibuprofen 800 mg tablet  
 oxyCODONE-acetaminophen 7.5-325 mg per tablet

## 2018-04-12 NOTE — ANESTHESIA PROCEDURE NOTES
Spinal Block    Start time: 4/12/2018 7:32 AM  End time: 4/12/2018 7:34 AM  Performed by: Noel Nicholas  Authorized by: Noel Nicholas     Pre-procedure: Indications: primary anesthetic  Preanesthetic Checklist: patient identified, risks and benefits discussed, anesthesia consent, site marked, patient being monitored and timeout performed    Timeout Time: 07:32          Spinal Block:   Patient Position:  Seated  Prep Region:  Lumbar  Prep: chlorhexidine      Location:  L3-4  Technique:  Single shot  Local:  Lidocaine 1%  Local Dose (mL):  3    Needle:   Needle Type:  Pencan  Needle Gauge:  25 G  Attempts:  1      Events: CSF confirmed, no blood with aspiration and no paresthesia        Assessment:  Insertion:  Uncomplicated  Patient tolerance:  Patient tolerated the procedure well with no immediate complications  Anesthesiology Spinal Procedure Note    Risks and benefits were discussed with patient and plans are to proceed. Patient was placed in the sitting position. The back was prepped at the lumbar region with Chlorhexidine. 1% xylocaine was used as local at L3-L4. A  25g Pencan was passed 1 times. Easy aspiration of CSF was noted. 12.5 mg hyperbaric Bupivacaine and .25mg  morphine sulfate was injected.

## 2018-04-12 NOTE — PROGRESS NOTES
SBAR OUT Report: Mother    Verbal report given to Charly Cordero RN on this patient, who is now being transferred to MIU for routine progression of care. The patient is wearing a green \"Anesthesia-Duramorph\" band. Report consisted of patient's Situation, Background, Assessment and Recommendations (SBAR). [place] ID bands were compared with the identification form, and verified with the patient and receiving nurse. Information from the OR Summary, Procedure Summary and Intake/Output and the [place] Report was reviewed with the receiving nurse; opportunity for questions and clarification provided.

## 2018-04-12 NOTE — PROGRESS NOTES
Pt presented for scheduled  section. Pt states she had a phone interview with [address] [place] [address] RN and the information that was obtained has not changed. POC reviewed. IV started, Consents witnessed. Lab work drawn, sent to lab. EFM placed.  FHTs 140

## 2018-04-12 NOTE — PROGRESS NOTES
Pt voiced that she continues to feel waves of nausea. Request for BS to be taken via FS to see if her level is low, states she has not eaten since 1900 4/11/18. BS 91. Pt was given javi cabrera to try.

## 2018-04-13 LAB
BASOPHILS # BLD: 0 K/UL (ref 0–0.2)
BASOPHILS NFR BLD: 0 % (ref 0–2)
DIFFERENTIAL METHOD BLD: ABNORMAL
EOSINOPHIL # BLD: 0.1 K/UL (ref 0–0.8)
EOSINOPHIL NFR BLD: 1 % (ref 0.5–7.8)
ERYTHROCYTE [DISTWIDTH] IN BLOOD BY AUTOMATED COUNT: 14.3 % (ref 11.9–14.6)
GLUCOSE BLD STRIP.AUTO-MCNC: 114 MG/DL (ref 65–100)
GLUCOSE BLD STRIP.AUTO-MCNC: 83 MG/DL (ref 65–100)
GLUCOSE BLD STRIP.AUTO-MCNC: 84 MG/DL (ref 65–100)
HCT VFR BLD AUTO: 29.6 % (ref 35.8–46.3)
HGB BLD-MCNC: 9.9 G/DL (ref 11.7–15.4)
IMM GRANULOCYTES # BLD: 0 K/UL (ref 0–0.5)
IMM GRANULOCYTES NFR BLD AUTO: 0 % (ref 0–5)
LYMPHOCYTES # BLD: 1.4 K/UL (ref 0.5–4.6)
LYMPHOCYTES NFR BLD: 14 % (ref 13–44)
MCH RBC QN AUTO: 33 PG (ref 26.1–32.9)
MCHC RBC AUTO-ENTMCNC: 33.4 G/DL (ref 31.4–35)
MCV RBC AUTO: 98.7 FL (ref 79.6–97.8)
MONOCYTES # BLD: 1.1 K/UL (ref 0.1–1.3)
MONOCYTES NFR BLD: 11 % (ref 4–12)
NEUTS SEG # BLD: 7.5 K/UL (ref 1.7–8.2)
NEUTS SEG NFR BLD: 74 % (ref 43–78)
PLATELET # BLD AUTO: 148 K/UL (ref 150–450)
PMV BLD AUTO: 10.1 FL (ref 10.8–14.1)
RBC # BLD AUTO: 3 M/UL (ref 4.05–5.25)
WBC # BLD AUTO: 10.1 K/UL (ref 4.3–11.1)

## 2018-04-13 PROCEDURE — 36415 COLL VENOUS BLD VENIPUNCTURE: CPT | Performed by: OBSTETRICS & GYNECOLOGY

## 2018-04-13 PROCEDURE — 74011250637 HC RX REV CODE- 250/637: Performed by: ANESTHESIOLOGY

## 2018-04-13 PROCEDURE — 82962 GLUCOSE BLOOD TEST: CPT

## 2018-04-13 PROCEDURE — 74011250636 HC RX REV CODE- 250/636: Performed by: ANESTHESIOLOGY

## 2018-04-13 PROCEDURE — 85025 COMPLETE CBC W/AUTO DIFF WBC: CPT | Performed by: OBSTETRICS & GYNECOLOGY

## 2018-04-13 PROCEDURE — 65270000029 HC RM PRIVATE

## 2018-04-13 PROCEDURE — 74011250637 HC RX REV CODE- 250/637: Performed by: OBSTETRICS & GYNECOLOGY

## 2018-04-13 RX ORDER — SODIUM CHLORIDE, SODIUM LACTATE, POTASSIUM CHLORIDE, CALCIUM CHLORIDE 600; 310; 30; 20 MG/100ML; MG/100ML; MG/100ML; MG/100ML
125 INJECTION, SOLUTION INTRAVENOUS CONTINUOUS
Status: DISCONTINUED | OUTPATIENT
Start: 2018-04-13 | End: 2018-04-13 | Stop reason: ALTCHOICE

## 2018-04-13 RX ORDER — IBUPROFEN 800 MG/1
800 TABLET ORAL
Status: DISCONTINUED | OUTPATIENT
Start: 2018-04-13 | End: 2018-04-14 | Stop reason: HOSPADM

## 2018-04-13 RX ORDER — OXYCODONE AND ACETAMINOPHEN 7.5; 325 MG/1; MG/1
1 TABLET ORAL
Status: DISCONTINUED | OUTPATIENT
Start: 2018-04-13 | End: 2018-04-14 | Stop reason: HOSPADM

## 2018-04-13 RX ADMIN — SIMETHICONE CHEW TAB 80 MG 80 MG: 80 TABLET ORAL at 09:11

## 2018-04-13 RX ADMIN — SIMETHICONE CHEW TAB 80 MG 80 MG: 80 TABLET ORAL at 15:10

## 2018-04-13 RX ADMIN — KETOROLAC TROMETHAMINE 15 MG: 30 INJECTION, SOLUTION INTRAMUSCULAR at 02:39

## 2018-04-13 RX ADMIN — SIMETHICONE CHEW TAB 80 MG 80 MG: 80 TABLET ORAL at 20:32

## 2018-04-13 RX ADMIN — IBUPROFEN 800 MG: 800 TABLET ORAL at 15:11

## 2018-04-13 RX ADMIN — ACETAMINOPHEN 650 MG: 325 TABLET ORAL at 07:13

## 2018-04-13 RX ADMIN — IBUPROFEN 800 MG: 800 TABLET ORAL at 20:31

## 2018-04-13 RX ADMIN — ACETAMINOPHEN 650 MG: 325 TABLET ORAL at 00:30

## 2018-04-13 RX ADMIN — KETOROLAC TROMETHAMINE 15 MG: 30 INJECTION, SOLUTION INTRAMUSCULAR at 09:11

## 2018-04-13 NOTE — PROGRESS NOTES
Dr. Dockery Sat on unit. MD states if patient is unable to void to perform in and out cath x1.   If still unable to void after this cath to place lovett

## 2018-04-13 NOTE — PROGRESS NOTES
Notified Dr. Jose Bennett that pt unable to urinate since lovett catheter removal at 2100. Received orders for straight catheter one time to empty bladder. Orders read back and verified with physician.

## 2018-04-13 NOTE — CONSULTS
Anesthesiology  Post-op Note    Post-op day 1 s/p  via spinal with neuraxial opioids for post-op pain management. Visit Vitals    /54 (BP 1 Location: Left arm, BP Patient Position: At rest)    Pulse 78    Temp 36.8 °C (98.3 °F)    Resp 18    LMP 2017    SpO2 98%    Breastfeeding Unknown     Airway patent, patient appropriately hydrated and appears euvolemic. Patient is Alert and oriented. Pain is well controlled. Pruritus is well controlled. Nausea is well controlled. No complaints about back or site of injection. Motor and sensory function has returned to baseline in lower extremities. Patient is satisfied with anesthetic and reports no complications. Continue current orders, then initiate surgeon's orders for pain management 24 hours after . Follow up per surgeon.

## 2018-04-13 NOTE — PROGRESS NOTES
Inserted straight urinary catheter using sterile technique with assistance from Tegan Arora RN. 1000ml of clear, yellow urine obtained. Pt tolerated well.

## 2018-04-13 NOTE — PROGRESS NOTES
SW assessment due to history of depression/anxiety. Patient states that she experienced this in high school, but nothing since. She denies any history of postpartum depression/anxiety. Patient has a strong support system of family and [employer]ist. Patient was receptive to  providing informational packet on  mood disorders (education/resources). No additional needs identified.     [place] Combs, [address] [facility]

## 2018-04-13 NOTE — PROGRESS NOTES
Post-Operative Day Number 1 Progress Note    Patient doing well post-op day 1 from  delivery without significant complaints. Pain controlled on current medication. Not voiding yet, normal lochia. Vitals:  Patient Vitals for the past 8 hrs:   BP Temp Pulse Resp   18 0750 109/65 97.8 °F (36.6 °C) 88 18   18 0500 100/54 98.3 °F (36.8 °C) 78 18     Temp (24hrs), Av °F (36.7 °C), Min:97.5 °F (36.4 °C), Max:98.9 °F (37.2 °C)      Vital signs stable, afebrile. Exam:  Patient without distress. Abdomen soft, fundus firm at level of umbilicus, non tender. Incision dry and clean without erythema. Lower extremities are negative for swelling, cords or tenderness. Lab/Data Review:  CBC:   Lab Results   Component Value Date/Time    WBC 10.1 2018 06:48 AM    HGB 9.9 (L) 2018 06:48 AM    HCT 29.6 (L) 2018 06:48 AM     (L) 2018 06:48 AM       Assessment and Plan:  Patient appears to be having uncomplicated post- course. Continue routine post-op care and maternal education.

## 2018-04-13 NOTE — PROGRESS NOTES
Blood sugar 2 hours after breakfast 83. Patient voided 300 ml of clear yellow urine without difficulty. IV discontinued.

## 2018-04-13 NOTE — PROGRESS NOTES
Shift assessment completed. See flow sheet. Church catheter removed, SCDs removed, IV fluids stopped, this RN assisted patient OOB to bathroom to provide and teach jorge care. Patient tolerated well and returned to bed with no assistance. Questions are encouraged and answered with patient. Instructed patient to call out with any needs or any further assistance OOB. Patient verbalized understanding.

## 2018-04-14 VITALS
RESPIRATION RATE: 18 BRPM | SYSTOLIC BLOOD PRESSURE: 115 MMHG | DIASTOLIC BLOOD PRESSURE: 79 MMHG | OXYGEN SATURATION: 98 % | HEART RATE: 82 BPM | TEMPERATURE: 98.1 F

## 2018-04-14 PROCEDURE — 74011250637 HC RX REV CODE- 250/637: Performed by: OBSTETRICS & GYNECOLOGY

## 2018-04-14 RX ORDER — OXYCODONE AND ACETAMINOPHEN 7.5; 325 MG/1; MG/1
1 TABLET ORAL
Qty: 28 TAB | Refills: 0 | Status: SHIPPED | OUTPATIENT
Start: 2018-04-14 | End: 2018-04-24 | Stop reason: ALTCHOICE

## 2018-04-14 RX ORDER — IBUPROFEN 800 MG/1
800 TABLET ORAL
Qty: 90 TAB | Refills: 0 | Status: SHIPPED
Start: 2018-04-14 | End: 2018-04-24 | Stop reason: ALTCHOICE

## 2018-04-14 RX ADMIN — IBUPROFEN 800 MG: 800 TABLET ORAL at 03:42

## 2018-04-14 RX ADMIN — SIMETHICONE CHEW TAB 80 MG 80 MG: 80 TABLET ORAL at 10:40

## 2018-04-14 RX ADMIN — IBUPROFEN 800 MG: 800 TABLET ORAL at 10:40

## 2018-04-14 RX ADMIN — SIMETHICONE CHEW TAB 80 MG 80 MG: 80 TABLET ORAL at 08:31

## 2018-04-14 NOTE — PROGRESS NOTES
Post-Operative Day Number 2 Progress/Discharge Note    Patient doing well post-op day 2 from  delivery without significant complaints. Pain controlled on current medication. Voiding without difficulty, normal lochia. Vitals:  Patient Vitals for the past 8 hrs:   BP Temp Pulse Resp   18 0822 115/79 98.1 °F (36.7 °C) 82 18     Temp (24hrs), Av.1 °F (36.7 °C), Min:98.1 °F (36.7 °C), Max:98.2 °F (36.8 °C)      Vital signs stable, afebrile. Exam:  Patient without distress. Abdomen soft, fundus firm at level of umbilicus, non tender. Incision dry and clean without erythema. Lower extremities are negative for swelling, cords or tenderness. Lab/Data Review: All lab results for the last 24 hours reviewed. Assessment and Plan:  Patient appears to be having uncomplicated post- course. Continue routine post-op care and maternal education. Plan discharge for today with follow up in our office in 1-2 weeks.

## 2018-04-14 NOTE — DISCHARGE SUMMARY
Obstetrical Discharge Summary     Name: Art Dos Santos MRN: [id number]  SSN: [id number]    YOB: 1990  Age: 32 y.o. Sex: female      Allergies: Review of patient's allergies indicates no known allergies. Admit Date: 2018    Discharge Date: 2018     Admitting Physician: Lucia Layne DO     Attending Physician:  Tauna Heimlich Alt, DO     * Admission Diagnoses: page 2018 Repeat  Section;H/O  section    * Discharge Diagnoses:   Information for the patient's :  Ernie Mylene [[id number]]   Delivery of a 7 lb 1.9 oz (3.23 kg) female infant via , Low Transverse on 2018 at 7:49 AM  by . Apgars were 9 and 9. Additional Diagnoses:   Hospital Problems as of 2018  Date Reviewed: 2018          Codes Class Noted - Resolved POA    * (Principal)H/O  section ICD-10-CM: Z98.891  ICD-9-CM: V45.89  2018 - Present Yes             Lab Results   Component Value Date/Time    ABO/Rh(D) A POSITIVE 2018 05:21 AM    Rubella, External immune 10/26/2017    GrBStrep, External neg 2018    ABO,Rh A positive 10/26/2017    There is no immunization history for the selected administration types on file for this patient.     * Procedures:    Procedure(s) with comments:   SECTION - page 2018 Repeat  Section      [place]  Depression Scale  I have been able to laugh and see the funny side of things: As much as I always could  I have looked forward with enjoyment to things: As much as I ever did  I have blamed myself unnecessarily when things went wrong: No, never  I have been anxious or worried for no good reason: No, not at all  I have felt scared or panicky for no very good reason: No, not at all  Things have been getting on top of me: No, I have been coping as well as ever  I have been so unhappy that I have had difficulty sleeping: No, not at all  I have felt sad or miserable: No, not at all  I have been so unhappy that I have been crying: No, never  The thought of harming myself has occurred to me: Never  Total Score: 0    * Discharge Condition: good    [facility] Course: Normal hospital course following the delivery. * Disposition: Home    Discharge Medications:   Current Discharge Medication List      START taking these medications    Details   ibuprofen (MOTRIN) 800 mg tablet Take 1 Tab by mouth every six (6) hours as needed. Qty: 90 Tab, Refills: 0    Associated Diagnoses: H/O  section      oxyCODONE-acetaminophen (PERCOCET 7.5) 7.5-325 mg per tablet Take 1 Tab by mouth every four (4) hours as needed. Max Daily Amount: 6 Tabs. Qty: 28 Tab, Refills: 0    Associated Diagnoses: H/O  section         CONTINUE these medications which have NOT CHANGED    Details   ranitidine (ZANTAC) 150 mg tablet Take 150 mg by mouth two (2) times a day. Indications: HEARTBURN      PNV no.24-iron-folic acid-dha (PRENATAL DHA+COMPLETE PRENATAL) -300 mg-mcg-mg cmpk Take  by mouth. * Follow-up Care/Patient Instructions:   Activity: No sex for 6 weeks, No driving while on analgesics and No heavy lifting for 4 weeks  Diet: Regular Diet  Wound Care: Keep wound clean and dry    Follow-up Information     Follow up With Details Comments [address] [facility] Alt, DO Schedule an appointment as soon as possible for a visit in 2 weeks  [address] [facility] [address] [facility]  [phone]      None   None ([phone]) Patient stated that they have no PCP             Signed By:  Aramis Christopher MD     2018

## 2018-04-14 NOTE — DISCHARGE INSTRUCTIONS
Section: What to Expect at [address] [place] [address]    A  section, or , is surgery to deliver your baby through a cut, called an incision, that the doctor makes in your lower belly and uterus. You may have some pain in your lower belly and need pain medicine for 1 to 2 weeks. You can expect some vaginal bleeding for several weeks. You will probably need about 6 weeks to fully recover. It is important to take it easy while the incision is healing. Avoid heavy lifting, strenuous activities, or exercises that strain the belly muscles while you are recovering. Ask a family member or friend for help with housework, cooking, and shopping. This care sheet gives you a general idea about how long it will take for you to recover. But each person recovers at a different pace. Follow the steps below to get better as quickly as possible. How can you care for yourself at home? Activity  ? · Rest when you feel tired. Getting enough sleep will help you recover. ? · Try to walk each day. Start by walking a little more than you did the day before. Bit by bit, increase the amount you walk. Walking boosts blood flow and helps prevent pneumonia, constipation, and blood clots. ? · Avoid strenuous activities, such as bicycle riding, jogging, weightlifting, and aerobic exercise, for 6 weeks or until your doctor says it is okay. ? · Until your doctor says it is okay, do not lift anything heavier than your baby. ? · Do not do sit-ups or other exercises that strain the belly muscles for 6 weeks or until your doctor says it is okay. ? · Hold a pillow over your incision when you cough or take deep breaths. This will support your belly and decrease your pain. ? · You may shower as usual. Pat the incision dry when you are done. ? · You will have some vaginal bleeding. Wear sanitary pads. Do not douche or use tampons until your doctor says it is okay. ? · Ask your doctor when you can drive again. ? · You will probably need to take at least 6 weeks off work. It depends on the type of work you do and how you feel. ? · Ask your doctor when it is okay for you to have sex. Diet  ? · You can eat your normal diet. If your stomach is upset, try bland, low-fat foods like plain rice, broiled chicken, toast, and yogurt. ? · Drink plenty of fluids (unless your doctor tells you not to). ? · You may notice that your bowel movements are not regular right after your surgery. This is common. Try to avoid constipation and straining with bowel movements. You may want to take a fiber supplement every day. If you have not had a bowel movement after a couple of days, ask your doctor about taking a mild laxative. ? · If you are breastfeeding, do not drink any alcohol. Medicines  ? · Your doctor will tell you if and when you can restart your medicines. He or she will also give you instructions about taking any new medicines. ? · If you take blood thinners, such as warfarin (Coumadin), clopidogrel (Plavix), or aspirin, be sure to talk to your doctor. He or she will tell you if and when to start taking those medicines again. Make sure that you understand exactly what your doctor wants you to do. ? · Take pain medicines exactly as directed. ¨ If the doctor gave you a prescription medicine for pain, take it as prescribed. ¨ If you are not taking a prescription pain medicine, ask your doctor if you can take an over-the-counter medicine. ? · If you think your pain medicine is making you sick to your stomach:  ¨ Take your medicine after meals (unless your doctor has told you not to). ¨ Ask your doctor for a different pain medicine. ? · If your doctor prescribed antibiotics, take them as directed. Do not stop taking them just because you feel better. You need to take the full course of antibiotics. Incision care  ?  · If you have strips of tape on the incision, leave the tape on for a week or until it falls off.   ? · Wash the area daily with warm, soapy water, and pat it dry. Don't use hydrogen peroxide or alcohol, which can slow healing. You may cover the area with a gauze bandage if it weeps or rubs against clothing. Change the bandage every day. ? · Keep the area clean and dry. Other instructions  ? · If you breastfeed your baby, you may be more comfortable while you are healing if you place the baby so that he or she is not resting on your belly. Try tucking your baby under your arm, with his or her body along the side you will be feeding on. Support your baby's upper body with your arm. With that hand you can control your baby's head to bring his or her mouth to your breast. This is sometimes called the football hold. Follow-up care is a key part of your treatment and safety. Be sure to make and go to all appointments, and call your doctor if you are having problems. It's also a good idea to know your test results and keep a list of the medicines you take. When should you call for help? Call 911 anytime you think you may need emergency care. For example, call if:  ? · You passed out (lost consciousness). ? · You have chest pain, are short of breath, or cough up blood. ?Call your doctor now or seek immediate medical care if:  ? · You have pain that does not get better after you take pain medicine. ? · You have severe vaginal bleeding. ? · You are dizzy or lightheaded, or you feel like you may faint. ? · You have new or worse pain in your belly or pelvis. ? · You have loose stitches, or your incision comes open. ? · You have symptoms of infection, such as:  ¨ Increased pain, swelling, warmth, or redness. ¨ Red streaks leading from the incision. ¨ Pus draining from the incision. ¨ A fever. ? · You have symptoms of a blood clot in your leg (called a deep vein thrombosis), such as:  ¨ Pain in your calf, back of the knee, thigh, or groin. ¨ Redness and swelling in your leg or groin. ? Watch closely for changes in your health, and be sure to contact your doctor if:  ? · You do not get better as expected. Where can you learn more? Go to http://deniz-gideon.info/. Enter M806 in the search box to learn more about \" Section: What to Expect at Home. \"  Current as of: 2017  Content Version: 11.4  © 2035-2271 [employer]. Care instructions adapted under license by [employer] (which disclaims liability or warranty for this information). If you have questions about a medical condition or this instruction, always ask your healthcare professional. [place] [address] any warranty or liability for your use of this information.

## 2018-04-24 PROBLEM — O09.292 HISTORY OF GESTATIONAL DIABETES IN PRIOR PREGNANCY, CURRENTLY PREGNANT, SECOND TRIMESTER: Status: RESOLVED | Noted: 2017-10-26 | Resolved: 2018-04-24

## 2018-04-24 PROBLEM — O09.299 CURRENT SINGLETON PREGNANCY WITH HISTORY OF CONGENITAL ANOMALY IN PRIOR CHILD, ANTEPARTUM: Status: RESOLVED | Noted: 2018-01-09 | Resolved: 2018-04-24

## 2018-04-24 PROBLEM — O24.410 DIET CONTROLLED GESTATIONAL DIABETES MELLITUS (GDM) IN SECOND TRIMESTER: Status: RESOLVED | Noted: 2017-12-04 | Resolved: 2018-04-24

## 2018-04-24 PROBLEM — O09.92 SUPERVISION OF HIGH-RISK PREGNANCY, SECOND TRIMESTER: Status: RESOLVED | Noted: 2017-10-26 | Resolved: 2018-04-24

## 2018-04-24 PROBLEM — Z86.32 HISTORY OF GESTATIONAL DIABETES IN PRIOR PREGNANCY, CURRENTLY PREGNANT, SECOND TRIMESTER: Status: RESOLVED | Noted: 2017-10-26 | Resolved: 2018-04-24

## 2025-04-07 NOTE — LACTATION NOTE
This note was copied from a baby's chart. In to see mom and infant for follow up. Mom finishing feeding on left breast with infant in cradle position. Good active tugging noted, sluggish towards end before coming off. Nipple round on release and intact. No c/o pain or any issues with overall latching and feeding. Reviewed 2nd 24 hr feeding/output expectations and normalcy of periods of cluster feeding. Lactation reviewed pumping and storage as needed at home once baby back up to birth wt. Reviewed how to manage period of engorgement and discharge instructions. Mom states does not need to see lactation before early discharge request tomorrow unless calls out. 2 = A lot of assistance

## (undated) DEVICE — SUT CHRMC 0 27IN CT1 BRN --

## (undated) DEVICE — MEDI-VAC NON-CONDUCTIVE SUCTION TUBING: Brand: CARDINAL HEALTH

## (undated) DEVICE — PENCIL ES L3M BTTN SWCH S STL HEX LOK BLDE ELECTRD HOLSTER

## (undated) DEVICE — SUTURE PLN GUT SZ 2-0 L27IN ABSRB YELLOWISH TAN L40MM CT 853H

## (undated) DEVICE — SUTURE VCRL SZ 3-0 L36IN ABSRB UD L36MM CT-1 1/2 CIR J944H

## (undated) DEVICE — AMD ANTIMICROBIAL NON-ADHERENT PAD,0.2% POLYHEXAMETHYLENE BIGUANIDE HCI (PHMB): Brand: TELFA

## (undated) DEVICE — CATH FOL TY IC BAG 16FR 2000ML -- CONVERT TO ITEM 363158

## (undated) DEVICE — SOLUTION IV 1000ML 0.9% SOD CHL

## (undated) DEVICE — SURGICAL PROCEDURE PACK C SECT CDS

## (undated) DEVICE — SOLUTION IRRIG 1000ML H2O STRL BLT

## (undated) DEVICE — SUTURE VCRL SZ 0 L36IN ABSRB UD L48MM CTX 1/2 CIR J978H

## (undated) DEVICE — AMD ANTIMICROBIAL GAUZE SPONGES,12 PLY USP TYPE VII, 0.2% POLYHEXAMETHYLENE BIGUANIDE HCI (PHMB): Brand: CURITY

## (undated) DEVICE — (D)STRIP SKN CLSR 0.5X4IN WHT --

## (undated) DEVICE — (D)PREP SKN CHLRAPRP APPL 26ML -- CONVERT TO ITEM 371833

## (undated) DEVICE — REM POLYHESIVE ADULT PATIENT RETURN ELECTRODE: Brand: VALLEYLAB

## (undated) DEVICE — SUTURE MCRYL SZ 3-0 L27IN ABSRB UD L60MM KS STR REV CUT Y523H

## (undated) DEVICE — Device: Brand: PORTEX

## (undated) DEVICE — KENDALL SCD EXPRESS SLEEVES, KNEE LENGTH, MEDIUM: Brand: KENDALL SCD